# Patient Record
Sex: MALE | Race: WHITE
[De-identification: names, ages, dates, MRNs, and addresses within clinical notes are randomized per-mention and may not be internally consistent; named-entity substitution may affect disease eponyms.]

---

## 2019-02-13 ENCOUNTER — HOSPITAL ENCOUNTER (OUTPATIENT)
Dept: HOSPITAL 92 - SCSRAD | Age: 29
Discharge: HOME | End: 2019-02-13
Attending: PSYCHIATRY & NEUROLOGY
Payer: COMMERCIAL

## 2019-02-13 DIAGNOSIS — M51.46: ICD-10-CM

## 2019-02-13 DIAGNOSIS — M54.30: Primary | ICD-10-CM

## 2019-02-13 PROCEDURE — 72100 X-RAY EXAM L-S SPINE 2/3 VWS: CPT

## 2019-02-13 NOTE — RAD
LUMBAR SPINE 3 VIEWS:

 

HISTORY:  

Back pain.

 

COMPARISON: 

None.

 

FINDINGS: 

Multiple Schmorl's nodes are present of the thoracolumbar spine.  No acute fracture or malalignment. 
 No listhesis.  No pars interarticularis defect. 

 

The paraspinal soft tissues are unremarkable.  No facet hypertrophy.  The SI joints are unremarkable.
  

 

IMPRESSION: 

Multiple Schmorl's odes can be seen with Scheuermann's disease.  No acute abnormality.

 

POS: CET